# Patient Record
Sex: MALE | Race: BLACK OR AFRICAN AMERICAN | NOT HISPANIC OR LATINO | URBAN - METROPOLITAN AREA
[De-identification: names, ages, dates, MRNs, and addresses within clinical notes are randomized per-mention and may not be internally consistent; named-entity substitution may affect disease eponyms.]

---

## 2017-09-11 ENCOUNTER — FOLLOW UP ESTABLISHED (OUTPATIENT)
Dept: URBAN - METROPOLITAN AREA CLINIC 44 | Facility: CLINIC | Age: 62
End: 2017-09-11
Payer: COMMERCIAL

## 2017-09-11 DIAGNOSIS — H25.813 COMBINED FORMS OF AGE-RELATED CATARACT, BILATERAL: ICD-10-CM

## 2017-09-11 DIAGNOSIS — H31.091 CHORIORETINAL SCARS, RIGHT EYE: Primary | ICD-10-CM

## 2017-09-11 PROCEDURE — 92014 COMPRE OPH EXAM EST PT 1/>: CPT | Performed by: OPTOMETRIST

## 2017-09-11 ASSESSMENT — INTRAOCULAR PRESSURE
OD: 14
OS: 14

## 2017-09-11 ASSESSMENT — VISUAL ACUITY
OS: 20/20
OD: 20/20

## 2017-09-11 ASSESSMENT — KERATOMETRY
OS: 45.00
OD: 45.38

## 2022-11-07 ENCOUNTER — OFFICE VISIT (OUTPATIENT)
Dept: URBAN - METROPOLITAN AREA CLINIC 43 | Facility: CLINIC | Age: 67
End: 2022-11-07
Payer: COMMERCIAL

## 2022-11-07 DIAGNOSIS — H40.023 OPEN ANGLE WITH BORDERLINE FINDINGS, HIGH RISK, BILATERAL: ICD-10-CM

## 2022-11-07 DIAGNOSIS — H31.091 CHORIORETINAL SCARS, RIGHT EYE: ICD-10-CM

## 2022-11-07 DIAGNOSIS — H52.4 PRESBYOPIA: ICD-10-CM

## 2022-11-07 DIAGNOSIS — H31.092 OTHER CHORIORETINAL SCARS, LEFT EYE: ICD-10-CM

## 2022-11-07 DIAGNOSIS — H25.813 COMBINED FORMS OF AGE-RELATED CATARACT, BILATERAL: Primary | ICD-10-CM

## 2022-11-07 PROCEDURE — 99204 OFFICE O/P NEW MOD 45 MIN: CPT | Performed by: OPTOMETRIST

## 2022-11-07 ASSESSMENT — VISUAL ACUITY
OD: 20/20
OS: 20/20

## 2022-11-07 ASSESSMENT — INTRAOCULAR PRESSURE
OS: 14
OD: 13

## 2022-11-07 ASSESSMENT — KERATOMETRY
OS: 45.00
OD: 45.38

## 2022-11-07 NOTE — IMPRESSION/PLAN
Impression: Chorioretinal scars, right eye Plan: Secondary to childhood injury. Stable. s/s RD reviewed with pt  Will continue to monitor.

## 2022-11-07 NOTE — IMPRESSION/PLAN
Impression: Open angle with borderline findings, high risk, bilateral: H40.023. 
hx of trauma OD from dodgeball, with chorioretinal scarring inferiorly Plan: IOP: midteens OU
cupping OD>OS 
RTC in 3-4 weeks for IOP check with VF/RNFL/PACHS

## 2022-12-14 ENCOUNTER — OFFICE VISIT (OUTPATIENT)
Dept: URBAN - METROPOLITAN AREA CLINIC 43 | Facility: CLINIC | Age: 67
End: 2022-12-14
Payer: COMMERCIAL

## 2022-12-14 DIAGNOSIS — H40.023 OPEN ANGLE WITH BORDERLINE FINDINGS, HIGH RISK, BILATERAL: Primary | ICD-10-CM

## 2022-12-14 DIAGNOSIS — H40.1131 PRIMARY OPEN-ANGLE GLAUCOMA, BILATERAL, MILD STAGE: ICD-10-CM

## 2022-12-14 PROCEDURE — 92083 EXTENDED VISUAL FIELD XM: CPT | Performed by: OPTOMETRIST

## 2022-12-14 PROCEDURE — 99213 OFFICE O/P EST LOW 20 MIN: CPT | Performed by: OPTOMETRIST

## 2022-12-14 PROCEDURE — 92133 CPTRZD OPH DX IMG PST SGM ON: CPT | Performed by: OPTOMETRIST

## 2022-12-14 PROCEDURE — 76514 ECHO EXAM OF EYE THICKNESS: CPT | Performed by: OPTOMETRIST

## 2022-12-14 RX ORDER — LATANOPROST 50 UG/ML
0.005 % SOLUTION OPHTHALMIC
Qty: 2.5 | Refills: 3 | Status: ACTIVE
Start: 2022-12-14

## 2022-12-14 ASSESSMENT — INTRAOCULAR PRESSURE
OS: 18
OD: 18

## 2022-12-14 NOTE — IMPRESSION/PLAN
Impression: Primary open-angle glaucoma, bilateral, mild stage: H40.1131. Plan: 2/2 cupping IOP high normal, very thin pachs OU
OCT RNFL today; OD: bdl sup and inf thin, OS: sup thinning, bdl inf thin HVF today: reliable, OD: sup arcuate, inf nasal step, OS: mostly clear field
start latanoprost qhs OU
return 6 weeks for IOP check

## 2023-01-27 ENCOUNTER — OFFICE VISIT (OUTPATIENT)
Dept: URBAN - METROPOLITAN AREA CLINIC 43 | Facility: CLINIC | Age: 68
End: 2023-01-27
Payer: COMMERCIAL

## 2023-01-27 DIAGNOSIS — H40.1131 PRIMARY OPEN-ANGLE GLAUCOMA, BILATERAL, MILD STAGE: Primary | ICD-10-CM

## 2023-01-27 PROCEDURE — 99213 OFFICE O/P EST LOW 20 MIN: CPT | Performed by: OPTOMETRIST

## 2023-01-27 RX ORDER — LATANOPROST 50 UG/ML
0.005 % SOLUTION OPHTHALMIC
Qty: 7.5 | Refills: 1 | Status: ACTIVE
Start: 2023-01-27

## 2023-01-27 ASSESSMENT — INTRAOCULAR PRESSURE
OS: 16
OD: 16
OS: 15
OD: 15

## 2023-01-27 NOTE — IMPRESSION/PLAN
Impression: Primary open-angle glaucoma, bilateral, mild stage: H40.1131. Plan: 2/2 cupping
IOP 16,15 on latanoprost qhs OU very thin pachs OU
IOP on latanoprost qhs OU
OCT RNFL 12/14/22; OD: bdl sup and inf thin, OS: sup thinning, bdl inf thin HVF 12/14/22: reliable, OD: sup arcuate, inf nasal step, OS: mostly clear field
cont latanoprost qhs OU
return in 4 months for IOP check

## 2025-07-01 NOTE — IMPRESSION/PLAN
Impression: Other chorioretinal scars, left eye: H31.092.  Plan: see other chorioretinal scar plan transfers with [facility] by d/c